# Patient Record
Sex: FEMALE | Race: WHITE
[De-identification: names, ages, dates, MRNs, and addresses within clinical notes are randomized per-mention and may not be internally consistent; named-entity substitution may affect disease eponyms.]

---

## 2019-02-12 ENCOUNTER — HOSPITAL ENCOUNTER (OUTPATIENT)
Dept: HOSPITAL 92 - SCSULT | Age: 24
Discharge: HOME | End: 2019-02-12
Attending: INTERNAL MEDICINE
Payer: COMMERCIAL

## 2019-02-12 DIAGNOSIS — K80.20: ICD-10-CM

## 2019-02-12 DIAGNOSIS — R11.2: Primary | ICD-10-CM

## 2019-02-12 PROCEDURE — 76700 US EXAM ABDOM COMPLETE: CPT

## 2019-02-12 NOTE — ULT
ABDOMINAL ULTRASOUND:

 

ULTRASOUND ABDOMEN COMPLETE

 

HISTORY: 

Nausea. 

 

TECHNIQUE:

Gray-scale ultrasound evaluation of the liver, gallbladder, spleen, pancreas, common bile duct, kidne
ys, abdominal aorta, and inferior vena cava (IVC).

 

FINDINGS: 

There is no focal hepatic lesion. There is a focus of increased echogenicity with shadowing seen at t
he gallbladder neck region, indicative of cholelithiasis. No evidence of cholecystitis. Common duct i
s normal measuring between 2-3 mm in diameter. No acute abnormality of the spleen or kidneys. No abdo
jean ascites. Intraabdominal contents otherwise grossly unremarkable by sonographic evaluation. 

 

IMPRESSION: 

Cholelithiasis without sonographic evidence of acute cholecystitis. 

 

POS: [facility]